# Patient Record
Sex: FEMALE | Race: WHITE | Employment: FULL TIME | URBAN - METROPOLITAN AREA
[De-identification: names, ages, dates, MRNs, and addresses within clinical notes are randomized per-mention and may not be internally consistent; named-entity substitution may affect disease eponyms.]

---

## 2020-12-31 ENCOUNTER — APPOINTMENT (OUTPATIENT)
Dept: CT IMAGING | Age: 50
End: 2020-12-31
Attending: EMERGENCY MEDICINE

## 2020-12-31 ENCOUNTER — HOSPITAL ENCOUNTER (EMERGENCY)
Age: 50
Discharge: HOME | End: 2020-12-31
Attending: EMERGENCY MEDICINE

## 2020-12-31 VITALS
RESPIRATION RATE: 18 BRPM | OXYGEN SATURATION: 100 % | SYSTOLIC BLOOD PRESSURE: 153 MMHG | DIASTOLIC BLOOD PRESSURE: 88 MMHG | TEMPERATURE: 98 F | BODY MASS INDEX: 25.71 KG/M2 | HEIGHT: 66 IN | HEART RATE: 89 BPM | WEIGHT: 160 LBS

## 2020-12-31 DIAGNOSIS — C49.9 SOFT TISSUE SARCOMA (HCC): Primary | ICD-10-CM

## 2020-12-31 LAB
ANION GAP SERPL CALC-SCNC: 7 MMOL/L (ref 0–18)
BASOPHILS # BLD AUTO: 0.06 X10(3) UL (ref 0–0.2)
BASOPHILS NFR BLD AUTO: 0.6 %
BUN BLD-MCNC: 12 MG/DL (ref 7–18)
BUN/CREAT SERPL: 11.3 (ref 10–20)
CALCIUM BLD-MCNC: 8.8 MG/DL (ref 8.5–10.1)
CHLORIDE SERPL-SCNC: 104 MMOL/L (ref 98–112)
CO2 SERPL-SCNC: 26 MMOL/L (ref 21–32)
CREAT BLD-MCNC: 1.06 MG/DL
DEPRECATED RDW RBC AUTO: 42.2 FL (ref 35.1–46.3)
EOSINOPHIL # BLD AUTO: 0.21 X10(3) UL (ref 0–0.7)
EOSINOPHIL NFR BLD AUTO: 2.2 %
ERYTHROCYTE [DISTWIDTH] IN BLOOD BY AUTOMATED COUNT: 12.7 % (ref 11–15)
GLUCOSE BLD-MCNC: 90 MG/DL (ref 70–99)
HCT VFR BLD AUTO: 42 %
HGB BLD-MCNC: 14.1 G/DL
IMM GRANULOCYTES # BLD AUTO: 0.02 X10(3) UL (ref 0–1)
IMM GRANULOCYTES NFR BLD: 0.2 %
LYMPHOCYTES # BLD AUTO: 1.62 X10(3) UL (ref 1–4)
LYMPHOCYTES NFR BLD AUTO: 17.2 %
MCH RBC QN AUTO: 30.9 PG (ref 26–34)
MCHC RBC AUTO-ENTMCNC: 33.6 G/DL (ref 31–37)
MCV RBC AUTO: 91.9 FL
MONOCYTES # BLD AUTO: 0.85 X10(3) UL (ref 0.1–1)
MONOCYTES NFR BLD AUTO: 9 %
NEUTROPHILS # BLD AUTO: 6.66 X10 (3) UL (ref 1.5–7.7)
NEUTROPHILS # BLD AUTO: 6.66 X10(3) UL (ref 1.5–7.7)
NEUTROPHILS NFR BLD AUTO: 70.8 %
OSMOLALITY SERPL CALC.SUM OF ELEC: 283 MOSM/KG (ref 275–295)
PLATELET # BLD AUTO: 304 10(3)UL (ref 150–450)
POTASSIUM SERPL-SCNC: 3.9 MMOL/L (ref 3.5–5.1)
RBC # BLD AUTO: 4.57 X10(6)UL
SODIUM SERPL-SCNC: 137 MMOL/L (ref 136–145)
WBC # BLD AUTO: 9.4 X10(3) UL (ref 4–11)

## 2020-12-31 PROCEDURE — 80048 BASIC METABOLIC PNL TOTAL CA: CPT | Performed by: EMERGENCY MEDICINE

## 2020-12-31 PROCEDURE — 36415 COLL VENOUS BLD VENIPUNCTURE: CPT

## 2020-12-31 PROCEDURE — 99283 EMERGENCY DEPT VISIT LOW MDM: CPT

## 2020-12-31 PROCEDURE — 99284 EMERGENCY DEPT VISIT MOD MDM: CPT

## 2020-12-31 PROCEDURE — 73202 CT UPPR EXTREMITY W/O&W/DYE: CPT | Performed by: EMERGENCY MEDICINE

## 2020-12-31 PROCEDURE — 85025 COMPLETE CBC W/AUTO DIFF WBC: CPT | Performed by: EMERGENCY MEDICINE

## 2020-12-31 NOTE — ED INITIAL ASSESSMENT (HPI)
53 y/o female to ED with c/o of large right upper arm cyst. Patient reports she was seen x1 month ago, was prescribed abx. Per patient area has not improved. I&D was never performed.

## 2020-12-31 NOTE — ED PROVIDER NOTES
Patient Seen in: THE [facility] Emergency Department In [place]      History   Patient presents with:  Cyst    Stated Complaint: cyst to right upper arm    HPI    CHIEF COMPLAINT: Bleeding mass on the right humerus    HISTORY OF PRESENT ILLNESS: Patient is a Social History    Tobacco Use      Smoking status: Current Every Day Smoker        Packs/day: 1.00    Alcohol use: Not on file    Drug use: Not on file             Review of Systems    Positive for stated complaint: cyst to right upper arm  Other ([id number])    Result Date: 12/31/2020  PROCEDURE:  CT HUMERUS RIGHT (W+WO) ([id number])  COMPARISON:  None.   INDICATIONS:  cyst to right upper arm, malignant vs sebaceous cyst  TECHNIQUE:  Multi-planar CT images were created without and with non-ionic intr no longer present. There was no indication for further evaluation, treatment or admission on an emergency basis. Comprehensive verbal and written discharge and follow-up instructions were provided to help prevent relapse or worsening.    I discussed the c

## 2021-01-01 NOTE — ED PROVIDER NOTES
I reviewed that chart and discussed the case.   I have examined the patient and noted right mid humerus lateral aspect with a 4 inch x 3 inch elevated mass with central ulceration oozing blood and tiny amount of stringy cheeselike discharge concerning for m

## 2021-01-06 ENCOUNTER — OFFICE VISIT (OUTPATIENT)
Dept: SURGERY | Facility: CLINIC | Age: 51
End: 2021-01-06
Payer: COMMERCIAL

## 2021-01-06 VITALS
HEART RATE: 97 BPM | BODY MASS INDEX: 28 KG/M2 | WEIGHT: 175 LBS | DIASTOLIC BLOOD PRESSURE: 92 MMHG | RESPIRATION RATE: 16 BRPM | OXYGEN SATURATION: 99 % | SYSTOLIC BLOOD PRESSURE: 145 MMHG | TEMPERATURE: 98 F

## 2021-01-06 DIAGNOSIS — M79.89 MASS OF SOFT TISSUE OF RIGHT UPPER EXTREMITY: Primary | ICD-10-CM

## 2021-01-06 PROCEDURE — 88321 CONSLTJ&REPRT SLD PREP ELSWR: CPT | Performed by: SURGERY

## 2021-01-06 PROCEDURE — 3077F SYST BP >= 140 MM HG: CPT | Performed by: SURGERY

## 2021-01-06 PROCEDURE — 20206 BIOPSY MUSCLE PERQ NEEDLE: CPT | Performed by: SURGERY

## 2021-01-06 PROCEDURE — 88341 IMHCHEM/IMCYTCHM EA ADD ANTB: CPT | Performed by: SURGERY

## 2021-01-06 PROCEDURE — 88342 IMHCHEM/IMCYTCHM 1ST ANTB: CPT | Performed by: SURGERY

## 2021-01-06 PROCEDURE — 3080F DIAST BP >= 90 MM HG: CPT | Performed by: SURGERY

## 2021-01-06 PROCEDURE — 88305 TISSUE EXAM BY PATHOLOGIST: CPT | Performed by: SURGERY

## 2021-01-06 PROCEDURE — 99215 OFFICE O/P EST HI 40 MIN: CPT | Performed by: SURGERY

## 2021-01-06 NOTE — CONSULTS
[facility]        Patient Name:  Mary Jarrell   YOB: 1970   Gender:  Female   Appt Date:  1/6/2021   Provider:  Genesis Clinton MD     PATIENT PROVIDERS  Referring Provider: THE [facility]  Primary Care Provid taking: Reported on 1/6/2021 ), Disp: 1 Bottle, Rfl: 0     Allergies Reviewed:  No Known Allergies     History:  Reviewed:  No past medical history on file.    Reviewed:  Past Surgical History:   Procedure Laterality Date   • Appendectomy     • Excis lumbar humerus 12/31/2020:  CONCLUSION:  There is a nonspecific irregular necrotic enhancing soft tissue mass with probable areas of necrosis noted within the subcutaneous tissues of the distal right arm measuring up to 4.6 x 4.4 x 5.5 cm is concerning for a soft

## 2021-01-08 ENCOUNTER — TELEPHONE (OUTPATIENT)
Dept: SURGERY | Facility: CLINIC | Age: 51
End: 2021-01-08

## 2021-01-08 DIAGNOSIS — C49.11 SARCOMA OF RIGHT UPPER EXTREMITY (HCC): Primary | ICD-10-CM

## 2021-01-08 NOTE — TELEPHONE ENCOUNTER
Pathology slides sent for [facility] clinic for further review. Initial diagnosis undifferentiated sarcoma. Discussed with patient over the phone. We will proceed with MRI extremity and CT chest.    Patient given number for central scheduling. Accepted

## 2021-01-11 ENCOUNTER — TELEPHONE (OUTPATIENT)
Dept: SURGERY | Facility: CLINIC | Age: 51
End: 2021-01-11

## 2021-01-11 DIAGNOSIS — M79.89 MASS OF SOFT TISSUE OF RIGHT UPPER EXTREMITY: Primary | ICD-10-CM

## 2021-01-11 RX ORDER — METRONIDAZOLE 7.5 MG/G
1 GEL TOPICAL 2 TIMES DAILY
Qty: 1 TUBE | Refills: 0 | Status: SHIPPED | OUTPATIENT
Start: 2021-01-11

## 2021-01-11 NOTE — TELEPHONE ENCOUNTER
Pt called and stated that tumor looked \"angrier\" and having more discharge. Had pt send photos of arm. Shaneka ordered metronidazole topical gel. LVM for patient regarding prescription.

## 2021-01-12 ENCOUNTER — TELEPHONE (OUTPATIENT)
Dept: SURGERY | Facility: CLINIC | Age: 51
End: 2021-01-12

## 2021-01-12 NOTE — TELEPHONE ENCOUNTER
Pt called and stated that her arm is not looking any better. Pt stated she would like the wound looked at for signs of infection. She also asked about applying honey to wound.   I told her there is a medication called medihoney, but to wait until after sh

## 2021-01-13 ENCOUNTER — OFFICE VISIT (OUTPATIENT)
Dept: SURGERY | Facility: CLINIC | Age: 51
End: 2021-01-13
Payer: COMMERCIAL

## 2021-01-13 VITALS
SYSTOLIC BLOOD PRESSURE: 135 MMHG | RESPIRATION RATE: 16 BRPM | WEIGHT: 173.63 LBS | BODY MASS INDEX: 28 KG/M2 | OXYGEN SATURATION: 97 % | DIASTOLIC BLOOD PRESSURE: 85 MMHG | TEMPERATURE: 98 F | HEART RATE: 104 BPM

## 2021-01-13 DIAGNOSIS — Z01.818 PRE-OP TESTING: ICD-10-CM

## 2021-01-13 DIAGNOSIS — C49.11 SARCOMA OF RIGHT UPPER EXTREMITY (HCC): Primary | ICD-10-CM

## 2021-01-13 DIAGNOSIS — M79.89 MASS OF SOFT TISSUE OF RIGHT UPPER EXTREMITY: Primary | ICD-10-CM

## 2021-01-13 PROCEDURE — 88305 TISSUE EXAM BY PATHOLOGIST: CPT | Performed by: SURGERY

## 2021-01-13 PROCEDURE — 3075F SYST BP GE 130 - 139MM HG: CPT | Performed by: SURGERY

## 2021-01-13 PROCEDURE — 11104 PUNCH BX SKIN SINGLE LESION: CPT | Performed by: SURGERY

## 2021-01-13 PROCEDURE — 99214 OFFICE O/P EST MOD 30 MIN: CPT | Performed by: SURGERY

## 2021-01-13 PROCEDURE — 99243 OFF/OP CNSLTJ NEW/EST LOW 30: CPT | Performed by: SURGERY

## 2021-01-13 PROCEDURE — 3079F DIAST BP 80-89 MM HG: CPT | Performed by: SURGERY

## 2021-01-13 NOTE — PATIENT INSTRUCTIONS
Surgeon:              Dr. Becky Pickett.  Stuart Franklin, PhD                                          Tel:         [phone]                                  Fax:        [phone]    Surgery/Procedure:     Right arm reconstruction with local flap, possible skin skip

## 2021-01-13 NOTE — PROGRESS NOTES
[facility]        Patient Name:  Aline Miguel   YOB: 1970   Gender:  Female   Appt Date:  1/13/2021   Provider:  Deann Salazar MD     PATIENT PROVIDERS  Referring Provider: THE [facility] Emergency Room  Primary Care [facility] History    Tobacco Use      Smoking status: Current Every Day Smoker        Packs/day: 1.00      Smokeless tobacco: Never Used    Alcohol use: Not on file    Drug use: Not on file     Reviewed:  Family History   Problem Relation Age of Onset   • Cancer Mot Immunohistochemical studies performed at [facility] show that the neoplastic cells are positive for SOX10, S100, and negative for HMB45, melan A, BRAF V600E, AE1/AE3, CAM5.2, SMA, and desmin.      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

## 2021-01-13 NOTE — PATIENT INSTRUCTIONS
Surgery:  Radical resection melanoma/soft tissue tumor right arm    Date of Surgery: TBD    Hospital:    [facility] [address] [place], [address]  Phone: [phone]    · This is an Outpatient procedure.   · Use the provided Chlorhex your primary care physician of your surgery and ask if him/her will need to see you prior to surgery. · If you develop symptoms of another illness prior to surgery please contact our office immediately.    · If this is an inpatient surgery, attending the S

## 2021-01-13 NOTE — CONSULTS
New Patient Consultation    Chief Complaint:  No chief complaint on file. 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 Seven You for reconstruction of her upper arm.  P weakness, change in appetite, weight loss, or weight gain. Endocrine: There is no history of poor/slow wound healing, weight loss/gain, fertility or hormone problems, cold intolerance, heat intolerance, excessive thirst, or thyroid disease.    Allergic/I severe headaches, seizure/epilepsy, speech problems, coordination problems, trembling/tremors, fainting/black outs, dizziness, memory problems, loss of sensation/numbness, problems walking, weakness, tingling or burning in hands/feet.    Psychiatric:  There structures, such as nerves, blood vessels, muscles,  tendons, risk of hematoma, seroma, flap failure, graft failure, foreign body reaction, allergic reaction, wound dehiscences, delayed wound healing, need for further surgery.       45 minutes were spent wi

## 2021-01-14 ENCOUNTER — TELEPHONE (OUTPATIENT)
Dept: SURGERY | Facility: CLINIC | Age: 51
End: 2021-01-14

## 2021-01-14 DIAGNOSIS — M79.89 MASS OF SOFT TISSUE OF RIGHT UPPER EXTREMITY: Primary | ICD-10-CM

## 2021-01-14 DIAGNOSIS — C49.11 SARCOMA OF RIGHT UPPER EXTREMITY (HCC): ICD-10-CM

## 2021-01-14 NOTE — TELEPHONE ENCOUNTER
I called the patient and scheduled her procedure with Dr Maci Boggs and dr Xander Rayo on 02/02/2021 at THE [facility]. Confirmed date and location.  Her clearance appt is scheduled 01/18/2021

## 2021-01-14 NOTE — TELEPHONE ENCOUNTER
[facility] for patient to review biopsy results. Findings benign. Final Diagnosis:   Skin, right breast, punch biopsy:  -Compound nevus, traumatized.

## 2021-01-20 ENCOUNTER — TELEPHONE (OUTPATIENT)
Dept: SURGERY | Facility: CLINIC | Age: 51
End: 2021-01-20

## 2021-01-20 DIAGNOSIS — M79.89 MASS OF SOFT TISSUE OF RIGHT UPPER EXTREMITY: Primary | ICD-10-CM

## 2021-01-20 NOTE — TELEPHONE ENCOUNTER
We noticed patient was seeing a team in [facility] and was scheduling the same surgery so we reached out to her seeing still had surgery scheduled with us she said she was staying with [place] we will cancel surgery here

## 2021-01-25 DIAGNOSIS — M79.89 MASS OF SOFT TISSUE OF RIGHT UPPER EXTREMITY: ICD-10-CM

## 2021-01-26 RX ORDER — METRONIDAZOLE 7.5 MG/G
GEL TOPICAL
Qty: 45 G | Refills: 0 | OUTPATIENT
Start: 2021-01-26

## 2022-02-03 PROBLEM — C43.9 MALIGNANT MELANOMA OF SKIN (HCC): Status: ACTIVE | Noted: 2021-01-01

## 2022-03-11 PROBLEM — E03.2 HYPOTHYROIDISM DUE TO MEDICAMENTS AND OTHER EXOGENOUS SUBSTANCES: Status: ACTIVE | Noted: 2021-06-15

## 2024-12-13 ENCOUNTER — OFFICE VISIT (OUTPATIENT)
Dept: OCCUPATIONAL MEDICINE | Age: 54
End: 2024-12-13
Attending: PHYSICIAN ASSISTANT

## (undated) NOTE — LETTER
[facility]  [facility], [address] [place] [address]  [facility]: [phone]  FAX: [address] [facility] [address] [phone]  Via Fax: [phone]